# Patient Record
Sex: MALE | Race: AMERICAN INDIAN OR ALASKA NATIVE
[De-identification: names, ages, dates, MRNs, and addresses within clinical notes are randomized per-mention and may not be internally consistent; named-entity substitution may affect disease eponyms.]

---

## 2020-02-26 ENCOUNTER — HOSPITAL ENCOUNTER (EMERGENCY)
Dept: HOSPITAL 5 - ED | Age: 40
LOS: 1 days | Discharge: HOME | End: 2020-02-27
Payer: SELF-PAY

## 2020-02-26 VITALS — SYSTOLIC BLOOD PRESSURE: 114 MMHG | DIASTOLIC BLOOD PRESSURE: 75 MMHG

## 2020-02-26 DIAGNOSIS — Z79.899: ICD-10-CM

## 2020-02-26 DIAGNOSIS — J01.00: ICD-10-CM

## 2020-02-26 DIAGNOSIS — Z79.2: ICD-10-CM

## 2020-02-26 DIAGNOSIS — F17.200: ICD-10-CM

## 2020-02-26 DIAGNOSIS — H92.01: Primary | ICD-10-CM

## 2020-02-26 PROCEDURE — 70450 CT HEAD/BRAIN W/O DYE: CPT
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
## 2020-02-26 NOTE — CAT SCAN REPORT
CT HEAD WITHOUT CONTRAST



INDICATION : 

Headache, pressure in eyes.



TECHNIQUE:  Axial, coronal and sagittal CT imaging was performed from the skull apex through the skul
l base without contrast.  All CT scans at this location are performed using CT dose reduction for ALA
RA by means of automated exposure control. 



COMPARISON:  None available.



FINDINGS:  



PARENCHYMA:  No mass, midline shift, hemorrhage, extraaxial collection or acute territorial infarctio
n. 

VENTRICLES:  Symmetric and normal in size.  

SOFT TISSUES:  A right globe prosthesis is noted with surgical changes seen along the left globe. No 
acute abnormality of the orbits or other included soft tissues.  

BONES:  No acute osseous abnormality.   

SINUSES: The right maxillary sinus is completely opacified. There is mild to moderate right sphenoid 
sinus thickening. The remaining visualized sinuses and the mastoid air cells are clear.

ADDITIONAL FINDINGS: None. 



IMPRESSION: 

1.  No acute intracranial abnormality.

2. Sinusitis.

3. Additional findings as above.



Signer Name: Alfonso Espinoza MD 

Signed: 2/26/2020 10:47 PM

 Workstation Name: [id number]

## 2020-02-27 NOTE — EMERGENCY DEPARTMENT REPORT
ED Headache HPI





- General


Chief Complaint: Headache


Stated Complaint: HEADACHE


Time Seen by Provider: 02/27/20 02:23


Source: patient, RN notes reviewed


Exam Limitations: no limitations





- History of Present Illness


Initial Comments: 





This is a 40-year-old male that presents today with a headache and right eye 

pain for 2 weeks.  Patient reports a history of a detached retina on the right 

eye for 2 years.  Patient denies follow-up with ophthalmologist recently.  He is

now complaining of the occasional halo that he usually have and throbbing pain 

on the right forehead.  Patient denies taking anything for symptomatic relief.  

Reports the vision is the same.  Denies nausea, vomiting, cough, fever, chills, 

or weakness.


Timing/Duration: 4-6 hours


Quality: moderate, throbbing


Head Injury Location: frontal (Right frontal)


Recent Head Trauma: no recent headache/trauma


Modifying Factors: improves with: exposure to light


Associated Symptoms: denies symptoms


Allergies/Adverse Reactions: 


Allergies





No Known Allergies Allergy (Unverified 02/26/20 22:05)


   








Home Medications: 


Ambulatory Orders





Amoxicillin/Potassium Clav [Augmentin 875-125 Tablet] 1 each PO BID #10 tablet 

02/27/20 


Fluticasone [Flonase] 1 spray NS QDAY #1 bottle 02/27/20 











ED Review of Systems


ROS: 


Stated complaint: HEADACHE


Other details as noted in HPI





Constitutional: denies: chills, fever


Eyes: eye pain (Right eye).  denies: eye discharge, vision change


Respiratory: denies: cough, shortness of breath, wheezing


Cardiovascular: denies: chest pain, palpitations


Gastrointestinal: denies: abdominal pain, nausea, diarrhea


Musculoskeletal: denies: back pain, joint swelling, arthralgia


Skin: denies: rash, lesions


Neurological: headache.  denies: weakness, paresthesias


Psychiatric: denies: anxiety, depression





ED Past Medical Hx





- Social History


Smoking Status: Light Tobacco Smoker


Substance Use Type: Alcohol





- Medications


Home Medications: 


                                Home Medications











 Medication  Instructions  Recorded  Confirmed  Last Taken  Type


 


Amoxicillin/Potassium Clav 1 each PO BID #10 tablet 02/27/20  Unknown Rx





[Augmentin 875-125 Tablet]     


 


Fluticasone [Flonase] 1 spray NS QDAY #1 bottle 02/27/20  Unknown Rx














ED Physical Exam





- General


Limitations: No Limitations


General appearance: alert, in no apparent distress





- Eye


Eye exam: Present: PERRL, EOMI.  Absent: scleral icterus, conjunctival 

injection, nystagmus, periorbital swelling, periorbital tenderness


Pupils: Present: normal accommodation





- ENT


ENT exam: Present: normal orophraynx, mucous membranes moist, TM's normal 

bilaterally, normal external ear exam, other (Maxillary sinus tenderness on 

percussion)





- Respiratory


Respiratory exam: Present: normal lung sounds bilaterally.  Absent: respiratory 

distress





- Cardiovascular


Cardiovascular Exam: Present: regular rate, normal rhythm.  Absent: systolic 

murmur, diastolic murmur, rubs, gallop





- GI/Abdominal


GI/Abdominal exam: Present: soft, normal bowel sounds





- Extremities Exam


Extremities exam: Present: normal inspection





- Neurological Exam


Neurological exam: Present: alert, oriented X3, normal gait





- Psychiatric


Psychiatric exam: Present: normal affect, normal mood





- Skin


Skin exam: Present: warm, dry, intact, normal color.  Absent: rash





ED Course





                                   Vital Signs











  02/26/20





  21:48


 


Temperature 97.9 F


 


Pulse Rate 91 H


 


Respiratory 18





Rate 


 


Blood Pressure 114/75


 


O2 Sat by Pulse 97





Oximetry 














ED Medical Decision Making





- Radiology Data


Radiology results: report reviewed





CT HEAD WITHOUT CONTRAST 





INDICATION : 


Headache, pressure in eyes. 





TECHNIQUE: Axial, coronal and sagittal CT imaging was performed from the skull 

apex through the 


 skull base without contrast. All CT scans at this location are performed using 

CT dose reduction 


 for ALARA by means of automated exposure control. 





COMPARISON: None available. 





FINDINGS: 





PARENCHYMA: No mass, midline shift, hemorrhage, extraaxial collection or acute 

territorial 


 infarction. 


VENTRICLES: Symmetric and normal in size. 


SOFT TISSUES: A right globe prosthesis is noted with surgical changes seen along

 the left globe. 


 No acute abnormality of the orbits or other included soft tissues. 


BONES: No acute osseous abnormality. 


SINUSES: The right maxillary sinus is completely opacified. There is mild to 

moderate right 


 sphenoid sinus thickening. The remaining visualized sinuses and the mastoid air

 cells are clear. 


ADDITIONAL FINDINGS: None. 





IMPRESSION: 


1. No acute intracranial abnormality. 


2. Sinusitis. 


3. Additional findings as above. 





- Medical Decision Making





This is a 40-year-old male who presents to the emergency room with a headache 

and right eye pain for 2 weeks.  Vitals are stable and patient in no acute 

distress.  Past medical history detached right retina.  Patient denies follow-up

 recently with an ophthalmologist.  CT of head was obtained.  No acute 

intracranial abnormality. Sinusitis.  Patient will be treated for an acute 

sinusitis.  PERRL, EOMI bilaterally.  Patient denies visual changes.  Reports a 

halo that is occasionally to the right eye.  Referral to ophthalmology for 

further evaluation of right eye.  Start Augmentin and Flonase.  Patient given 

strict return instructions.  Follow-up with primary care doctor in 2 to 3 days. 

 Patient discharged home stable.


Critical care attestation.: 


If time is entered above; I have spent that time in minutes in the direct care 

of this critically ill patient, excluding procedure time.








ED Disposition


Clinical Impression: 


 Ocular pain, right eye





Headache


Qualifiers:


 Headache type: tension-type Headache chronicity pattern: acute headache 

Intractability: not intractable Qualified Code(s): G44.209 - Tension-type 

headache, unspecified, not intractable





Acute maxillary sinusitis


Qualifiers:


 Recurrence: non-recurrent Qualified Code(s): J01.00 - Acute maxillary 

sinusitis, unspecified





Disposition: DC-01 TO HOME OR SELFCARE


Is pt being admited?: No


Condition: Stable


Instructions:  Eye Pain (ED), Sinusitis (ED)


Additional Instructions: 


Use warm moist compresses over sinuses.





Use ibuprofen or Tylenol for pain.





Use nasal saline spray.





Avoid taking antihistamines.





Follow up with Primary Care Provider if fever, short of breath, chest pain, or 

symptoms do not improve as discussed.


Prescriptions: 


Amoxicillin/Potassium Clav [Augmentin 875-125 Tablet] 1 each PO BID #10 tablet


Fluticasone [Flonase] 1 spray NS QDAY #1 bottle


Referrals: 


AIMEE GUNTER MD [Staff Physician] - 3-5 Days


[facility] [Provider Group] - 3-5 Days


[facility] [Outside] - 3-5 Days


Time of Disposition: 03:27

## 2020-10-17 ENCOUNTER — HOSPITAL ENCOUNTER (EMERGENCY)
Dept: HOSPITAL 5 - ED | Age: 40
Discharge: HOME | End: 2020-10-17
Payer: COMMERCIAL

## 2020-10-17 VITALS — DIASTOLIC BLOOD PRESSURE: 69 MMHG | SYSTOLIC BLOOD PRESSURE: 149 MMHG
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
## 2020-10-17 DIAGNOSIS — S49.92XA: Primary | ICD-10-CM
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
## 2020-10-17 DIAGNOSIS — Z79.899: ICD-10-CM
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
## 2020-10-17 DIAGNOSIS — Z79.2: ICD-10-CM

## 2020-10-17 DIAGNOSIS — F17.200: ICD-10-CM

## 2020-10-17 PROCEDURE — 99283 EMERGENCY DEPT VISIT LOW MDM: CPT
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
